# Patient Record
Sex: FEMALE | Race: WHITE
[De-identification: names, ages, dates, MRNs, and addresses within clinical notes are randomized per-mention and may not be internally consistent; named-entity substitution may affect disease eponyms.]

---

## 2017-01-13 ENCOUNTER — HOSPITAL ENCOUNTER (OUTPATIENT)
Age: 67
Discharge: HOME | End: 2017-01-13
Payer: MEDICAID

## 2017-01-13 DIAGNOSIS — D05.91: Primary | ICD-10-CM

## 2017-09-11 ENCOUNTER — HOSPITAL ENCOUNTER (OUTPATIENT)
Dept: HOSPITAL 76 - LAB.S | Age: 67
Discharge: HOME | End: 2017-09-11
Attending: NURSE PRACTITIONER
Payer: MEDICARE

## 2017-09-11 DIAGNOSIS — I10: Primary | ICD-10-CM

## 2017-09-11 DIAGNOSIS — J44.9: ICD-10-CM

## 2017-09-11 LAB
ALBUMIN/GLOB SERPL: 1.5 {RATIO} (ref 1–2.2)
ANION GAP SERPL CALCULATED.4IONS-SCNC: 7 MMOL/L (ref 6–13)
BASOPHILS NFR BLD AUTO: 0.1 10^3/UL (ref 0–0.1)
BASOPHILS NFR BLD AUTO: 1.5 %
BILIRUB BLD-MCNC: 0.4 MG/DL (ref 0.2–1)
BUN SERPL-MCNC: 10 MG/DL (ref 6–20)
CALCIUM UR-MCNC: 9.4 MG/DL (ref 8.5–10.3)
CHLORIDE SERPL-SCNC: 103 MMOL/L (ref 101–111)
CHOLEST SERPL-MCNC: 264 MG/DL
CO2 SERPL-SCNC: 26 MMOL/L (ref 21–32)
CREAT SERPLBLD-SCNC: 0.7 MG/DL (ref 0.4–1)
EOSINOPHIL # BLD AUTO: 0.3 10^3/UL (ref 0–0.7)
EOSINOPHIL NFR BLD AUTO: 4.9 %
ERYTHROCYTE [DISTWIDTH] IN BLOOD BY AUTOMATED COUNT: 14.4 % (ref 12–15)
GFRSERPLBLD MDRD-ARVRAT: 84 ML/MIN/{1.73_M2} (ref 89–?)
GLOBULIN SER-MCNC: 2.6 G/DL (ref 2.1–4.2)
GLUCOSE SERPL-MCNC: 93 MG/DL (ref 70–100)
HCT VFR BLD AUTO: 40.2 % (ref 37–47)
HDLC SERPL-MCNC: 57 MG/DL
HDLC SERPL: 4.6 {RATIO} (ref ?–4.4)
HGB UR QL STRIP: 13.1 G/DL (ref 12–16)
LDLC/HDLC SERPL: 3 {RATIO} (ref ?–4.4)
LYMPHOCYTES # SPEC AUTO: 1.7 10^3/UL (ref 1.5–3.5)
LYMPHOCYTES NFR BLD AUTO: 26.8 %
MCH RBC QN AUTO: 29.2 PG (ref 27–31)
MCHC RBC AUTO-ENTMCNC: 32.5 G/DL (ref 32–36)
MCV RBC AUTO: 89.6 FL (ref 81–99)
MONOCYTES # BLD AUTO: 0.6 10^3/UL (ref 0–1)
MONOCYTES NFR BLD AUTO: 10.5 %
NEUTROPHILS # BLD AUTO: 3.5 10^3/UL (ref 1.5–6.6)
NEUTROPHILS # SNV AUTO: 6.2 X10^3/UL (ref 4.8–10.8)
NEUTROPHILS NFR BLD AUTO: 56.3 %
NRBC # BLD AUTO: 0.1 /100WBC
PDW BLD AUTO: 10.7 FL (ref 7.9–10.8)
POTASSIUM SERPL-SCNC: 4.5 MMOL/L (ref 3.5–5)
PROT SPEC-MCNC: 6.6 G/DL (ref 6.7–8.2)
RBC MAR: 4.48 10^6/UL (ref 4.2–5.4)
SODIUM SERPLBLD-SCNC: 136 MMOL/L (ref 135–145)
TRIGL P FAST SERPL-MCNC: 183 MG/DL
VLDLC SERPL-SCNC: 37 MG/DL
WBC # BLD: 6.2 X10^3/UL

## 2017-09-11 PROCEDURE — 80053 COMPREHEN METABOLIC PANEL: CPT

## 2017-09-11 PROCEDURE — 84443 ASSAY THYROID STIM HORMONE: CPT

## 2017-09-11 PROCEDURE — 85025 COMPLETE CBC W/AUTO DIFF WBC: CPT

## 2017-09-11 PROCEDURE — 80061 LIPID PANEL: CPT

## 2017-09-11 PROCEDURE — 36415 COLL VENOUS BLD VENIPUNCTURE: CPT

## 2018-01-24 ENCOUNTER — HOSPITAL ENCOUNTER (OUTPATIENT)
Dept: HOSPITAL 76 - DI.S | Age: 68
Discharge: HOME | End: 2018-01-24
Attending: NURSE PRACTITIONER
Payer: MEDICARE

## 2018-01-24 DIAGNOSIS — Z85.3: ICD-10-CM

## 2018-01-24 DIAGNOSIS — Z12.31: Primary | ICD-10-CM

## 2018-01-24 PROCEDURE — 77067 SCR MAMMO BI INCL CAD: CPT

## 2018-01-25 NOTE — MAMMOGRAPHY REPORT
DATE OF SERVICE: 01/24/2018

 

DIGITAL SCREENING MAMMOGRAM:  01/24/2018

 

CLINICAL INDICATION:  A 67-year-old with personal history of right breast cancer, status post 

lumpectomy and radiation therapy for screening.

 

COMPARISON:  01/2017, 01/2016, 01/2015, 07/2014, 01/2014, 07/2013, 01/2013, 07/2012, 04/2012, 

03/2012.

 

TECHNIQUE:  Routine CC and MLO projections were obtained of the breasts.

 

The breasts demonstrate scattered fibroglandular densities bilaterally.  Postoperative and 

posttreatment changes in the left breast are stable.  A few punctate, typically benign 

calcifications are present.  No suspicious masses, clustered microcalcifications, or regions of

architectural distortion are identified.

 

IMPRESSION:  Benign findings.

 

RECOMMENDATIONS:  Routine annual screening unless otherwise clinically indicated.

 

BIRADS category 2 benign findings.

 

STANDARD QUALIFYING STATEMENTS

1.  This examination was reviewed with the aid of Computed-Aided Detection (CAD).

2.  A negative or benign imaging report should not delay biopsy if clinically suspicious 

findings are present.  Consider surgical consultation if warranted.  More than 5% of cancers 

are not identified by imaging.

3.  Dense breasts may obscure an underlying neoplasm.

 

 

DD: 01/25/2018 13:10

TD: 01/25/2018 22:28

Job #: [id number]

## 2018-10-29 ENCOUNTER — HOSPITAL ENCOUNTER (OUTPATIENT)
Dept: HOSPITAL 76 - LAB.S | Age: 68
End: 2018-10-29
Attending: NURSE PRACTITIONER
Payer: MEDICARE

## 2018-10-29 DIAGNOSIS — I10: Primary | ICD-10-CM

## 2018-10-29 DIAGNOSIS — F32.9: ICD-10-CM

## 2018-10-29 LAB
ALBUMIN DIAFP-MCNC: 3.9 G/DL (ref 3.2–5.5)
ALBUMIN/GLOB SERPL: 1.2 {RATIO} (ref 1–2.2)
ALP SERPL-CCNC: 55 IU/L (ref 42–121)
ALT SERPL W P-5'-P-CCNC: 16 IU/L (ref 10–60)
ANION GAP SERPL CALCULATED.4IONS-SCNC: 6 MMOL/L (ref 6–13)
AST SERPL W P-5'-P-CCNC: 21 IU/L (ref 10–42)
BASOPHILS NFR BLD AUTO: 0.1 10^3/UL (ref 0–0.1)
BASOPHILS NFR BLD AUTO: 1.2 %
BILIRUB BLD-MCNC: 0.3 MG/DL (ref 0.2–1)
BUN SERPL-MCNC: 12 MG/DL (ref 6–20)
CALCIUM UR-MCNC: 9.4 MG/DL (ref 8.5–10.3)
CHLORIDE SERPL-SCNC: 102 MMOL/L (ref 101–111)
CHOLEST SERPL-MCNC: 279 MG/DL
CO2 SERPL-SCNC: 29 MMOL/L (ref 21–32)
CREAT SERPLBLD-SCNC: 0.8 MG/DL (ref 0.4–1)
EOSINOPHIL # BLD AUTO: 0.5 10^3/UL (ref 0–0.7)
EOSINOPHIL NFR BLD AUTO: 6.5 %
ERYTHROCYTE [DISTWIDTH] IN BLOOD BY AUTOMATED COUNT: 13.5 % (ref 12–15)
GFRSERPLBLD MDRD-ARVRAT: 72 ML/MIN/{1.73_M2} (ref 89–?)
GLOBULIN SER-MCNC: 3.3 G/DL (ref 2.1–4.2)
GLUCOSE SERPL-MCNC: 108 MG/DL (ref 70–100)
HDLC SERPL-MCNC: 64 MG/DL
HDLC SERPL: 4.4 {RATIO} (ref ?–4.4)
HGB UR QL STRIP: 13.7 G/DL (ref 12–16)
LDLC SERPL CALC-MCNC: 177 MG/DL
LDLC/HDLC SERPL: 2.8 {RATIO} (ref ?–4.4)
LYMPHOCYTES # SPEC AUTO: 1.6 10^3/UL (ref 1.5–3.5)
LYMPHOCYTES NFR BLD AUTO: 22 %
MCH RBC QN AUTO: 29.9 PG (ref 27–31)
MCHC RBC AUTO-ENTMCNC: 33 G/DL (ref 32–36)
MCV RBC AUTO: 90.6 FL (ref 81–99)
MONOCYTES # BLD AUTO: 0.9 10^3/UL (ref 0–1)
MONOCYTES NFR BLD AUTO: 11.6 %
NEUTROPHILS # BLD AUTO: 4.3 10^3/UL (ref 1.5–6.6)
NEUTROPHILS # SNV AUTO: 7.3 X10^3/UL (ref 4.8–10.8)
NEUTROPHILS NFR BLD AUTO: 58.7 %
PDW BLD AUTO: 11.2 FL (ref 7.9–10.8)
PLATELET # BLD: 235 10^3/UL (ref 130–450)
PROT SPEC-MCNC: 7.2 G/DL (ref 6.7–8.2)
RBC MAR: 4.58 10^6/UL (ref 4.2–5.4)
SODIUM SERPLBLD-SCNC: 137 MMOL/L (ref 135–145)
VLDLC SERPL-SCNC: 38 MG/DL

## 2018-10-29 PROCEDURE — 83721 ASSAY OF BLOOD LIPOPROTEIN: CPT

## 2018-10-29 PROCEDURE — 80061 LIPID PANEL: CPT

## 2018-10-29 PROCEDURE — 36415 COLL VENOUS BLD VENIPUNCTURE: CPT

## 2018-10-29 PROCEDURE — 80053 COMPREHEN METABOLIC PANEL: CPT

## 2018-10-29 PROCEDURE — 85025 COMPLETE CBC W/AUTO DIFF WBC: CPT

## 2018-10-29 PROCEDURE — 84443 ASSAY THYROID STIM HORMONE: CPT

## 2018-12-30 ENCOUNTER — HOSPITAL ENCOUNTER (OUTPATIENT)
Dept: HOSPITAL 76 - EMS | Age: 68
Discharge: ANOTHER HOSPITAL | End: 2018-12-30
Attending: SURGERY
Payer: MEDICARE

## 2018-12-30 ENCOUNTER — HOSPITAL ENCOUNTER (EMERGENCY)
Dept: HOSPITAL 76 - ED | Age: 68
Discharge: HOME | End: 2018-12-30
Payer: MEDICARE

## 2018-12-30 VITALS — DIASTOLIC BLOOD PRESSURE: 82 MMHG | SYSTOLIC BLOOD PRESSURE: 152 MMHG

## 2018-12-30 DIAGNOSIS — F17.200: ICD-10-CM

## 2018-12-30 DIAGNOSIS — K62.5: Primary | ICD-10-CM

## 2018-12-30 DIAGNOSIS — K52.9: Primary | ICD-10-CM

## 2018-12-30 DIAGNOSIS — I10: ICD-10-CM

## 2018-12-30 DIAGNOSIS — R10.84: ICD-10-CM

## 2018-12-30 LAB
ALBUMIN DIAFP-MCNC: 4 G/DL (ref 3.2–5.5)
ALBUMIN/GLOB SERPL: 1.1 {RATIO} (ref 1–2.2)
ALP SERPL-CCNC: 59 IU/L (ref 42–121)
ALT SERPL W P-5'-P-CCNC: 28 IU/L (ref 10–60)
ANION GAP SERPL CALCULATED.4IONS-SCNC: 11 MMOL/L (ref 6–13)
AST SERPL W P-5'-P-CCNC: 31 IU/L (ref 10–42)
BASOPHILS NFR BLD AUTO: 0.1 10^3/UL (ref 0–0.1)
BASOPHILS NFR BLD AUTO: 0.3 %
BILIRUB BLD-MCNC: 0.8 MG/DL (ref 0.2–1)
BUN SERPL-MCNC: 15 MG/DL (ref 6–20)
CALCIUM UR-MCNC: 9.1 MG/DL (ref 8.5–10.3)
CHLORIDE SERPL-SCNC: 98 MMOL/L (ref 101–111)
CLARITY UR REFRACT.AUTO: CLEAR
CO2 SERPL-SCNC: 26 MMOL/L (ref 21–32)
CREAT SERPLBLD-SCNC: 0.9 MG/DL (ref 0.4–1)
EOSINOPHIL # BLD AUTO: 0 10^3/UL (ref 0–0.7)
EOSINOPHIL NFR BLD AUTO: 0.1 %
ERYTHROCYTE [DISTWIDTH] IN BLOOD BY AUTOMATED COUNT: 13.9 % (ref 12–15)
GFRSERPLBLD MDRD-ARVRAT: 63 ML/MIN/{1.73_M2} (ref 89–?)
GLOBULIN SER-MCNC: 3.5 G/DL (ref 2.1–4.2)
GLUCOSE SERPL-MCNC: 157 MG/DL (ref 70–100)
GLUCOSE UR QL STRIP.AUTO: NEGATIVE MG/DL
HGB UR QL STRIP: 16.2 G/DL (ref 12–16)
INR PPP: 1.1 (ref 0.8–1.2)
KETONES UR QL STRIP.AUTO: 15 MG/DL
LIPASE SERPL-CCNC: 24 U/L (ref 22–51)
LYMPHOCYTES # SPEC AUTO: 1 10^3/UL (ref 1.5–3.5)
LYMPHOCYTES NFR BLD AUTO: 5.2 %
MCH RBC QN AUTO: 30.5 PG (ref 27–31)
MCHC RBC AUTO-ENTMCNC: 33.6 G/DL (ref 32–36)
MCV RBC AUTO: 90.7 FL (ref 81–99)
MONOCYTES # BLD AUTO: 1.6 10^3/UL (ref 0–1)
MONOCYTES NFR BLD AUTO: 8.4 %
NEUTROPHILS # BLD AUTO: 16.5 10^3/UL (ref 1.5–6.6)
NEUTROPHILS # SNV AUTO: 19.2 X10^3/UL (ref 4.8–10.8)
NEUTROPHILS NFR BLD AUTO: 86 %
NITRITE UR QL STRIP.AUTO: NEGATIVE
PDW BLD AUTO: 9.1 FL (ref 7.9–10.8)
PH UR STRIP.AUTO: 6 PH (ref 5–7.5)
PLATELET # BLD: 246 10^3/UL (ref 130–450)
PROT SPEC-MCNC: 7.5 G/DL (ref 6.7–8.2)
PROT UR STRIP.AUTO-MCNC: NEGATIVE MG/DL
PROTHROM ACT/NOR PPP: 12.1 SECS (ref 9.9–12.6)
RBC # UR STRIP.AUTO: NEGATIVE /UL
RBC MAR: 5.32 10^6/UL (ref 4.2–5.4)
SODIUM SERPLBLD-SCNC: 135 MMOL/L (ref 135–145)
SP GR UR STRIP.AUTO: 1.02 (ref 1–1.03)
UROBILINOGEN UR QL STRIP.AUTO: (no result) E.U./DL
UROBILINOGEN UR STRIP.AUTO-MCNC: NEGATIVE MG/DL

## 2018-12-30 PROCEDURE — 96374 THER/PROPH/DIAG INJ IV PUSH: CPT

## 2018-12-30 PROCEDURE — 81001 URINALYSIS AUTO W/SCOPE: CPT

## 2018-12-30 PROCEDURE — 96375 TX/PRO/DX INJ NEW DRUG ADDON: CPT

## 2018-12-30 PROCEDURE — 96361 HYDRATE IV INFUSION ADD-ON: CPT

## 2018-12-30 PROCEDURE — 74177 CT ABD & PELVIS W/CONTRAST: CPT

## 2018-12-30 PROCEDURE — 99283 EMERGENCY DEPT VISIT LOW MDM: CPT

## 2018-12-30 PROCEDURE — 83690 ASSAY OF LIPASE: CPT

## 2018-12-30 PROCEDURE — 87086 URINE CULTURE/COLONY COUNT: CPT

## 2018-12-30 PROCEDURE — 87046 STOOL CULTR AEROBIC BACT EA: CPT

## 2018-12-30 PROCEDURE — 81003 URINALYSIS AUTO W/O SCOPE: CPT

## 2018-12-30 PROCEDURE — 36415 COLL VENOUS BLD VENIPUNCTURE: CPT

## 2018-12-30 PROCEDURE — 87045 FECES CULTURE AEROBIC BACT: CPT

## 2018-12-30 PROCEDURE — 83605 ASSAY OF LACTIC ACID: CPT

## 2018-12-30 PROCEDURE — 80053 COMPREHEN METABOLIC PANEL: CPT

## 2018-12-30 PROCEDURE — 85610 PROTHROMBIN TIME: CPT

## 2018-12-30 PROCEDURE — 85025 COMPLETE CBC W/AUTO DIFF WBC: CPT

## 2018-12-30 PROCEDURE — 87493 C DIFF AMPLIFIED PROBE: CPT

## 2018-12-30 NOTE — ED PHYSICIAN DOCUMENTATION
PD HPI ABD PAIN





- Stated complaint


Stated Complaint: ABD PX





- Chief complaint


Chief Complaint: Abd Pain





- History obtained from


History obtained from: Patient, EMS





- History of Present Illness


Timing - onset: Other (She been constipated for a few days but starting around 

10:00 last night started to have severe abdominal cramps associated with 

vomiting and diarrhea.  She has not vomited in almost 12 hours but the diarrhea 

continues and became grossly bloody this morning.  She denies any fevers.  No 

recent travel, sick contacts, or antibiotic use.  She is a history of 

hysterectomy but no other abdominal surgeries or bowel problems.)





Review of Systems


Ten Systems: 10 systems reviewed and negative


Constitutional: denies: Fever, Chills


Eyes: reports: Reviewed and negative


GI: reports: Abdominal Pain, Nausea, Vomiting, Diarrhea, Bloody / black stool.  

denies: Hematemesis





PD PAST MEDICAL HISTORY





- Past Medical History


Cardiovascular: Hypertension


Respiratory: Asthma, Shortness of breath


Endocrine/Autoimmune: None


GI: None


GYN: Other


: None


HEENT: None


Psych: Panic attacks


Musculoskeletal: Osteoarthritis


Derm: None





- Past Surgical History


Past Surgical History: Yes


General: Colonoscopy


Ortho: Spine surgery


/GYN:  section, Dilation and currettage, Tubal ligation, Hysterectomy,

LEEP (Cervical surgery)


HEENT: Tonsil/Adenoidectomy





- Present Medications


Home Medications: 


                                Ambulatory Orders











 Medication  Instructions  Recorded  Confirmed


 


Lisinopril 40 mg PO DAILY 05/08/13 10/02/17


 


Multivitamin [Multivitamins] 1 each PO DAILY 07/21/14 10/02/17


 


Carvedilol 12.5 mg PO BID 02/03/15 10/02/17


 


Calcium Carb, Citrate/Vit D3 600 mg PO BID 08/04/15 10/02/17





[Calcium + D3 ER Tablet]   


 


Albuterol 2.5 mg INH Q4H PRN 16


 


Albuterol Sulf [Ventolin Hfa 2 puffs INH Q4H PRN 16





Inhaler]   


 


Tiotropium Bromide [Spiriva] 2 puffs IH DAILY 12/12/16 10/02/17


 


buPROPion [Wellbutrin Sr] 150 mg PO BID 10/02/17 10/02/17


 


Ciprofloxacin HCl [Cipro] 500 mg PO BID #20 tablet 18 


 


Hydrocodone/Acetaminophen 1 - 2 each PO Q6H PRN #10 tablet 18 





[Hydrocodon-Acetaminophen 5-325]   


 


Metronidazole [Flagyl] 500 mg PO BID #20 tablet 18 


 


amLODIPine [Norvasc] 5 mg PO DAILY 18














- Allergies


Allergies/Adverse Reactions: 


                                    Allergies











Allergy/AdvReac Type Severity Reaction Status Date / Time


 


Latex, Natural Rubber Allergy  Itching Verified 18 12:49


 


Penicillins Allergy  Hives Verified 18 12:49


 


adhesive AdvReac Intermediate Rash Verified 18 12:49














- Social History


Does the pt smoke?: Yes


Smoking Status: Current every day smoker


Does the pt drink ETOH?: No


Does the pt have substance abuse?: No





- Immunizations


Immunizations are current?: No


Immunizations: TDAP >10years/unknown





PD ED PE NORMAL





- Vitals


Vital signs reviewed: Yes (Hypertensive and tachycardic)





- General


General: Alert and oriented X 3, No acute distress





- HEENT


HEENT: PERRL, EOMI





- Neck


Neck: Supple, no meningeal sign, No bony TTP





- Cardiac


Cardiac: RRR, No murmur





- Respiratory


Respiratory: No respiratory distress, Clear bilaterally





- Abdomen


Abdomen: Other (Soft with slightly diminished but active bowel tones and left-

sided abdominal tenderness without surgical signs)





- Back


Back: No spinal TTP





- Derm


Derm: Normal color, Warm and dry





- Extremities


Extremities: No edema, No calf tenderness / cord





- Neuro


Neuro: Alert and oriented X 3, Normal speech





- Psych


Psych: Normal mood, Normal affect





Results





- Vitals


Vitals: 


                               Vital Signs - 24 hr











  18





  12:39


 


Temperature 37 C


 


Heart Rate 115 H


 


Respiratory 20





Rate 


 


Blood Pressure 187/114 H


 


O2 Saturation 98








                                     Oxygen











O2 Source                      Room air

















- Labs


Labs: 


                                  Microbiology











 18 13:13 Campylobacter Antigen Assay - Final





 Stool 








                                Laboratory Tests











  18





  12:59 12:59 12:59


 


WBC  19.2 H  


 


RBC  5.32  


 


Hgb  16.2 H  


 


Hct  48.2 H  


 


MCV  90.7  


 


MCH  30.5  


 


MCHC  33.6  


 


RDW  13.9  


 


Plt Count  246  


 


MPV  9.1  


 


Neut # (Auto)  16.5 H  


 


Lymph # (Auto)  1.0 L  


 


Mono # (Auto)  1.6 H  


 


Eos # (Auto)  0.0  


 


Baso # (Auto)  0.1  


 


Absolute Nucleated RBC  0.00  


 


Nucleated RBC %  0.0  


 


PT   12.1 


 


INR   1.1 


 


Sodium    135


 


Potassium    3.9


 


Chloride    98 L


 


Carbon Dioxide    26


 


Anion Gap    11.0


 


BUN    15


 


Creatinine    0.9


 


Estimated GFR (MDRD)    63 L


 


Glucose    157 H


 


Lactic Acid   


 


Calcium    9.1


 


Total Bilirubin    0.8


 


AST    31


 


ALT    28


 


Alkaline Phosphatase    59


 


Total Protein    7.5


 


Albumin    4.0


 


Globulin    3.5


 


Albumin/Globulin Ratio    1.1


 


Lipase    24


 


Urine Color   


 


Urine Clarity   


 


Urine pH   


 


Ur Specific Gravity   


 


Urine Protein   


 


Urine Glucose (UA)   


 


Urine Ketones   


 


Urine Occult Blood   


 


Urine Nitrite   


 


Urine Bilirubin   


 


Urine Urobilinogen   


 


Ur Leukocyte Esterase   


 


Ur Microscopic Review   


 


Urine Culture Comments   














  18





  12:59 13:13


 


WBC  


 


RBC  


 


Hgb  


 


Hct  


 


MCV  


 


MCH  


 


MCHC  


 


RDW  


 


Plt Count  


 


MPV  


 


Neut # (Auto)  


 


Lymph # (Auto)  


 


Mono # (Auto)  


 


Eos # (Auto)  


 


Baso # (Auto)  


 


Absolute Nucleated RBC  


 


Nucleated RBC %  


 


PT  


 


INR  


 


Sodium  


 


Potassium  


 


Chloride  


 


Carbon Dioxide  


 


Anion Gap  


 


BUN  


 


Creatinine  


 


Estimated GFR (MDRD)  


 


Glucose  


 


Lactic Acid  1.4 


 


Calcium  


 


Total Bilirubin  


 


AST  


 


ALT  


 


Alkaline Phosphatase  


 


Total Protein  


 


Albumin  


 


Globulin  


 


Albumin/Globulin Ratio  


 


Lipase  


 


Urine Color   DARK YELLOW


 


Urine Clarity   CLEAR


 


Urine pH   6.0


 


Ur Specific Gravity   1.020


 


Urine Protein   NEGATIVE


 


Urine Glucose (UA)   NEGATIVE


 


Urine Ketones   15 H


 


Urine Occult Blood   NEGATIVE


 


Urine Nitrite   NEGATIVE


 


Urine Bilirubin   NEGATIVE


 


Urine Urobilinogen   0.2 (NORMAL)


 


Ur Leukocyte Esterase   NEGATIVE


 


Ur Microscopic Review   NOT INDICATED


 


Urine Culture Comments   NOT INDICATED














- Rads (name of study)


  ** CT A/P


Radiology: EMP read contemporaneously (Transverse descending and sigmoid coliti

s)





PD MEDICAL DECISION MAKING





- ED course


ED course: 





68-year-old woman with bloody diarrhea and left-sided abdominal tenderness cons

istent with colitis which is corroborated on CT.  Feeling better after meds and 

fluids here and will be treated with Cipro and Flagyl.





Departure





- Departure


Disposition: 01 Home, Self Care


Clinical Impression: 


 Colitis





Abdominal pain


Qualifiers:


 Abdominal location: generalized Qualified Code(s): R10.84 - Generalized 

abdominal pain





Condition: Good


Record reviewed to determine appropriate education?: Yes


Instructions:  ED Diarrhea Bacterial


Prescriptions: 


Ciprofloxacin HCl [Cipro] 500 mg PO BID #20 tablet


Hydrocodone/Acetaminophen [Hydrocodon-Acetaminophen 5-325] 1 - 2 each PO Q6H PRN

#10 tablet


 PRN Reason: pain


Metronidazole [Flagyl] 500 mg PO BID #20 tablet


Comments: 


Call your doctor to arrange a follow-up appointment, make the next available 

appointment.  In the interim, return anytime if worse or if new symptoms 

develop.





Your blood pressure was elevated today on check into the emergency department.  

This does not mean that you have hypertension, it is a common phenomenon to come

 to the emergency department and have elevated blood pressure.  I recommend that

 you see your primary care physician within the week to have it rechecked when 

you are feeling better.

## 2018-12-30 NOTE — CT REPORT
Reason:  IV only, abd pain w hematochezia

Procedure Date:  12/30/2018   

Accession Number:  [id number] / [id number]                    

Procedure:  CT  - Abdomen/Pelvis W/ CPT Code:  

 

FULL RESULT:

 

 

EXAM:

CT ABDOMEN AND PELVIS

 

EXAM DATE: 12/30/2018 01:44 PM.

 

CLINICAL HISTORY: Abd pain w hematochezia.

 

COMPARISONS: None.

 

TECHNIQUE: Routine helical CT imaging was performed through the abdomen 

and pelvis. IV contrast: OPTI 320  100mL. Enteric contrast: No. 

Reconstructions: Coronal and sagittal.

 

In accordance with CT protocol optimization, one or more of the following 

dose reduction techniques were utilized for this exam: automated exposure 

control, adjustment of mA and/or KV based on patient size, or use of 

iterative reconstructive technique.

 

FINDINGS:

Lung Bases: Unremarkable.

 

Liver: Probable hepatic steatosis. No focal parenchymal lesion 

demonstrated.

 

Gallbladder/Bile Ducts: Unremarkable.

 

Spleen: Normal.

 

Pancreas: Atrophic. No other significant abnormality.

 

Adrenal Glands: Normal.

 

Kidneys: No significant abnormality.

 

Peritoneal Cavity/Bowel: No ascites or pneumoperitoneum. No bowel 

obstruction or abnormal stool burden. Diffuse, low density colonic wall 

thickening and adjacent mild inflammatory stranding from the hepatic 

flexure to the rectosigmoid junction, most pronounced in the distal 

transverse colon. Possible appendicolith; otherwise normal appendix 

(coronal images 21-25).

 

Pelvic Organs: Status post hysterectomy. The urinary bladder is 

unremarkable.

 

Vasculature: Moderate atherosclerosis without aneurysm or other 

significant abnormality.

 

Bones: Scoliosis and degenerative change. No acute osseous abnormality or 

aggressive osseous lesion.

 

Other: None.

IMPRESSION: Transverse, descending, and sigmoid colitis.

 

[facility]

## 2019-01-14 ENCOUNTER — HOSPITAL ENCOUNTER (OUTPATIENT)
Dept: HOSPITAL 76 - LAB.S | Age: 69
Discharge: HOME | End: 2019-01-14
Attending: NURSE PRACTITIONER
Payer: MEDICARE

## 2019-01-14 DIAGNOSIS — K52.9: Primary | ICD-10-CM

## 2019-01-14 LAB
ALBUMIN DIAFP-MCNC: 3.8 G/DL (ref 3.2–5.5)
ALBUMIN/GLOB SERPL: 1.2 {RATIO} (ref 1–2.2)
ALP SERPL-CCNC: 40 IU/L (ref 42–121)
ALT SERPL W P-5'-P-CCNC: 25 IU/L (ref 10–60)
ANION GAP SERPL CALCULATED.4IONS-SCNC: 7 MMOL/L (ref 6–13)
AST SERPL W P-5'-P-CCNC: 25 IU/L (ref 10–42)
BASOPHILS NFR BLD AUTO: 0.1 10^3/UL (ref 0–0.1)
BASOPHILS NFR BLD AUTO: 1.4 %
BILIRUB BLD-MCNC: 0.4 MG/DL (ref 0.2–1)
BUN SERPL-MCNC: 10 MG/DL (ref 6–20)
CALCIUM UR-MCNC: 9.8 MG/DL (ref 8.5–10.3)
CHLORIDE SERPL-SCNC: 102 MMOL/L (ref 101–111)
CO2 SERPL-SCNC: 29 MMOL/L (ref 21–32)
CREAT SERPLBLD-SCNC: 0.6 MG/DL (ref 0.4–1)
EOSINOPHIL # BLD AUTO: 0.5 10^3/UL (ref 0–0.7)
EOSINOPHIL NFR BLD AUTO: 6 %
ERYTHROCYTE [DISTWIDTH] IN BLOOD BY AUTOMATED COUNT: 14.5 % (ref 12–15)
GFRSERPLBLD MDRD-ARVRAT: 100 ML/MIN/{1.73_M2} (ref 89–?)
GLOBULIN SER-MCNC: 3.2 G/DL (ref 2.1–4.2)
GLUCOSE SERPL-MCNC: 100 MG/DL (ref 70–100)
HGB UR QL STRIP: 14.2 G/DL (ref 12–16)
LYMPHOCYTES # SPEC AUTO: 1.6 10^3/UL (ref 1.5–3.5)
LYMPHOCYTES NFR BLD AUTO: 18.3 %
MCH RBC QN AUTO: 30.1 PG (ref 27–31)
MCHC RBC AUTO-ENTMCNC: 32.4 G/DL (ref 32–36)
MCV RBC AUTO: 93 FL (ref 81–99)
MONOCYTES # BLD AUTO: 0.8 10^3/UL (ref 0–1)
MONOCYTES NFR BLD AUTO: 9.3 %
NEUTROPHILS # BLD AUTO: 5.8 10^3/UL (ref 1.5–6.6)
NEUTROPHILS # SNV AUTO: 8.9 X10^3/UL (ref 4.8–10.8)
NEUTROPHILS NFR BLD AUTO: 65 %
PDW BLD AUTO: 11.5 FL (ref 7.9–10.8)
PLATELET # BLD: 278 10^3/UL (ref 130–450)
PROT SPEC-MCNC: 7 G/DL (ref 6.7–8.2)
RBC MAR: 4.7 10^6/UL (ref 4.2–5.4)
SODIUM SERPLBLD-SCNC: 138 MMOL/L (ref 135–145)

## 2019-01-14 PROCEDURE — 85025 COMPLETE CBC W/AUTO DIFF WBC: CPT

## 2019-01-14 PROCEDURE — 80053 COMPREHEN METABOLIC PANEL: CPT

## 2019-01-14 PROCEDURE — 36415 COLL VENOUS BLD VENIPUNCTURE: CPT

## 2019-02-25 ENCOUNTER — HOSPITAL ENCOUNTER (OUTPATIENT)
Dept: HOSPITAL 76 - DI | Age: 69
Discharge: HOME | End: 2019-02-25
Attending: NURSE PRACTITIONER
Payer: MEDICARE

## 2019-02-25 DIAGNOSIS — Z85.3: ICD-10-CM

## 2019-02-25 DIAGNOSIS — Z12.31: Primary | ICD-10-CM

## 2019-02-25 DIAGNOSIS — Z85.118: ICD-10-CM

## 2019-02-25 PROCEDURE — 77067 SCR MAMMO BI INCL CAD: CPT

## 2019-02-25 PROCEDURE — 77063 BREAST TOMOSYNTHESIS BI: CPT

## 2019-02-26 NOTE — MAMMOGRAPHY REPORT
Reason:  MAMMOGRAPHIC SCREENING FOR BREAST CANCER

Procedure Date:  02/25/2019   

Accession Number:  [id number] / [id number]                    

Procedure:  RODRIGUEZ - Screening Mammo w/Tate CPT Code:  

 

FULL RESULT:

 

 

EXAM: Screening Mammo w/Tate

 

DATE: 2/25/2019 4:30 PM

 

CLINICAL HISTORY: Screening encounter. Personal history of right breast 

cancer status post lumpectomy and radiation as well as personal history 

of lung cancer status post right upper lobectomy.

 

TECHNIQUE: Bilateral CC and MLO views were obtained.

 

COMPARISON: 1/24/2018 through 1/29/2015.

 

FINDINGS:

The breasts demonstrate scattered fibroglandular densities bilaterally. 

Posttreatment changes in the right breast are stable. No suspicious 

masses, clustered microcalcifications, or regions of architectural 

distortion are identified.

 

IMPRESSION: Benign findings

 

RECOMMENDATION: Routine annual screening unless otherwise clinically 

indicated.

 

BIRADS CATEGORY 2: Benign findings

 

STANDARD QUALIFYING STATEMENTS:

1.  This examination was not reviewed with the aid of Computer-Aided 

Detection (CAD).

2.  A negative or benign  imaging report should not delay biopsy if 

clinically suspicious findings are present.  Consider surgical 

consultation if warrented.  More than 5% of cancers are not identified by 

imaging.

3.  Dense breasts may obscure an underlying neoplasm.

4. This examination was reviewed with the aid of 3D breast imaging 

(tomosynthesis).

## 2019-04-23 ENCOUNTER — HOSPITAL ENCOUNTER (OUTPATIENT)
Dept: HOSPITAL 76 - DI | Age: 69
Discharge: HOME | End: 2019-04-23
Attending: INTERNAL MEDICINE
Payer: MEDICARE

## 2019-04-23 DIAGNOSIS — R93.5: Primary | ICD-10-CM

## 2019-04-23 PROCEDURE — 93975 VASCULAR STUDY: CPT

## 2019-04-23 NOTE — ULTRASOUND REPORT
Reason:  CT- COLON WALL THICKENING FROM DISTAL

Procedure Date:  04/23/2019   

Accession Number:  [id number] / [id number]                    

Procedure:  US  - Arterial Visceral Complete CPT Code:  

 

FULL RESULT:

 

 

EXAM:

MESENTERIC/CELIAC ARTERY DOPPLER ULTRASOUND

 

EXAM DATE: 4/23/2019 09:52 AM.

 

CLINICAL HISTORY: CT- Colon wall thickening from distal.

 

COMPARISON: None.

 

TECHNIQUE: Real-time sonographic vascular imaging was performed by the 

sonographer through the mesenteric arterial system with a linear 

transducer utilizing color-flow, Doppler flow and spectral analysis. 

Multiple representative static images were saved for review.

 

FINDINGS: The upper abdominal aorta is visually atherosclerotic without 

visualized aneurysm in the documented areas.

 

The visualized celiac axis, SMA, hepatic artery and splenic artery are 

patent with peak systolic velocities as described. All waveforms 

demonstrate preserved brisk systolic upstrokes.

 

Aorta: 71cm/sec.

 

Celiac Axis:

Proximal: 158.6 cm/sec.

Mid: 168.7 cm/sec.

Distal: 195.6 cm/sec.

 

Hepatic Artery: 228 cm/sec.*

Splenic Artery: 67.4 cm/sec.**

 

* Hepatic artery @ manjinder hep 113 cm/sec.

** Splenic not seen @ madisyn axis.

 

Superior Mesenteric Artery

Proximal: 122.5 cm/sec.

Mid: 108 cm/sec.

Distal: 105.4 cm/sec.

 

Inferior Mesenteric Artery: Not seen.

IMPRESSION: Normal patency and waveforms of the above visualized visceral 

vasculature.

 

[facility]

## 2019-06-03 ENCOUNTER — HOSPITAL ENCOUNTER (OUTPATIENT)
Dept: HOSPITAL 76 - LAB.F | Age: 69
Discharge: HOME | End: 2019-06-03
Attending: NURSE PRACTITIONER
Payer: MEDICARE

## 2019-06-03 DIAGNOSIS — E78.5: Primary | ICD-10-CM

## 2019-06-03 LAB
CHOLEST SERPL-MCNC: 202 MG/DL
HDLC SERPL-MCNC: 64 MG/DL
HDLC SERPL: 3.2 {RATIO} (ref ?–4.4)
LDLC SERPL CALC-MCNC: 124 MG/DL
LDLC/HDLC SERPL: 1.9 {RATIO} (ref ?–4.4)
VLDLC SERPL-SCNC: 14 MG/DL

## 2019-06-03 PROCEDURE — 80061 LIPID PANEL: CPT

## 2019-06-03 PROCEDURE — 36415 COLL VENOUS BLD VENIPUNCTURE: CPT

## 2019-06-03 PROCEDURE — 83721 ASSAY OF BLOOD LIPOPROTEIN: CPT

## 2019-06-11 ENCOUNTER — HOSPITAL ENCOUNTER (EMERGENCY)
Dept: HOSPITAL 76 - ED | Age: 69
Discharge: HOME | End: 2019-06-11
Payer: MEDICARE

## 2019-06-11 VITALS — SYSTOLIC BLOOD PRESSURE: 200 MMHG | DIASTOLIC BLOOD PRESSURE: 99 MMHG

## 2019-06-11 DIAGNOSIS — K92.1: Primary | ICD-10-CM

## 2019-06-11 DIAGNOSIS — R19.7: ICD-10-CM

## 2019-06-11 DIAGNOSIS — R11.10: ICD-10-CM

## 2019-06-11 DIAGNOSIS — F17.200: ICD-10-CM

## 2019-06-11 DIAGNOSIS — Z87.19: ICD-10-CM

## 2019-06-11 DIAGNOSIS — I10: ICD-10-CM

## 2019-06-11 LAB
ALBUMIN DIAFP-MCNC: 4.2 G/DL (ref 3.2–5.5)
ALBUMIN/GLOB SERPL: 1.3 {RATIO} (ref 1–2.2)
ALP SERPL-CCNC: 52 IU/L (ref 42–121)
ALT SERPL W P-5'-P-CCNC: 19 IU/L (ref 10–60)
ANION GAP SERPL CALCULATED.4IONS-SCNC: 13 MMOL/L (ref 6–13)
AST SERPL W P-5'-P-CCNC: 23 IU/L (ref 10–42)
BASOPHILS NFR BLD AUTO: 0.1 10^3/UL (ref 0–0.1)
BASOPHILS NFR BLD AUTO: 0.9 %
BILIRUB BLD-MCNC: 0.8 MG/DL (ref 0.2–1)
BUN SERPL-MCNC: 12 MG/DL (ref 6–20)
CALCIUM UR-MCNC: 9.5 MG/DL (ref 8.5–10.3)
CHLORIDE SERPL-SCNC: 99 MMOL/L (ref 101–111)
CLARITY UR REFRACT.AUTO: CLEAR
CO2 SERPL-SCNC: 23 MMOL/L (ref 21–32)
CREAT SERPLBLD-SCNC: 0.6 MG/DL (ref 0.4–1)
EOSINOPHIL # BLD AUTO: 0.6 10^3/UL (ref 0–0.7)
EOSINOPHIL NFR BLD AUTO: 5 %
ERYTHROCYTE [DISTWIDTH] IN BLOOD BY AUTOMATED COUNT: 13.3 % (ref 12–15)
GFRSERPLBLD MDRD-ARVRAT: 100 ML/MIN/{1.73_M2} (ref 89–?)
GLOBULIN SER-MCNC: 3.2 G/DL (ref 2.1–4.2)
GLUCOSE SERPL-MCNC: 121 MG/DL (ref 70–100)
GLUCOSE UR QL STRIP.AUTO: NEGATIVE MG/DL
HGB UR QL STRIP: 14 G/DL (ref 12–16)
KETONES UR QL STRIP.AUTO: 15 MG/DL
LIPASE SERPL-CCNC: 21 U/L (ref 22–51)
LYMPHOCYTES # SPEC AUTO: 1.5 10^3/UL (ref 1.5–3.5)
LYMPHOCYTES NFR BLD AUTO: 13.9 %
MCH RBC QN AUTO: 29.6 PG (ref 27–31)
MCHC RBC AUTO-ENTMCNC: 33.2 G/DL (ref 32–36)
MCV RBC AUTO: 89.2 FL (ref 81–99)
MONOCYTES # BLD AUTO: 1 10^3/UL (ref 0–1)
MONOCYTES NFR BLD AUTO: 8.7 %
NEUTROPHILS # BLD AUTO: 8 10^3/UL (ref 1.5–6.6)
NEUTROPHILS # SNV AUTO: 11.1 X10^3/UL (ref 4.8–10.8)
NEUTROPHILS NFR BLD AUTO: 71.5 %
NITRITE UR QL STRIP.AUTO: NEGATIVE
PDW BLD AUTO: 8.6 FL (ref 7.9–10.8)
PH UR STRIP.AUTO: 5.5 PH (ref 5–7.5)
PLATELET # BLD: 200 10^3/UL (ref 130–450)
PROT SPEC-MCNC: 7.4 G/DL (ref 6.7–8.2)
PROT UR STRIP.AUTO-MCNC: NEGATIVE MG/DL
RBC # UR STRIP.AUTO: NEGATIVE /UL
RBC # URNS HPF: (no result) /HPF (ref 0–5)
RBC MAR: 4.71 10^6/UL (ref 4.2–5.4)
SODIUM SERPLBLD-SCNC: 135 MMOL/L (ref 135–145)
SP GR UR STRIP.AUTO: 1.02 (ref 1–1.03)
SQUAMOUS URNS QL MICRO: (no result)
UROBILINOGEN UR QL STRIP.AUTO: (no result) E.U./DL
UROBILINOGEN UR STRIP.AUTO-MCNC: NEGATIVE MG/DL

## 2019-06-11 PROCEDURE — 96372 THER/PROPH/DIAG INJ SC/IM: CPT

## 2019-06-11 PROCEDURE — 83690 ASSAY OF LIPASE: CPT

## 2019-06-11 PROCEDURE — 87086 URINE CULTURE/COLONY COUNT: CPT

## 2019-06-11 PROCEDURE — 80053 COMPREHEN METABOLIC PANEL: CPT

## 2019-06-11 PROCEDURE — 81001 URINALYSIS AUTO W/SCOPE: CPT

## 2019-06-11 PROCEDURE — 99283 EMERGENCY DEPT VISIT LOW MDM: CPT

## 2019-06-11 PROCEDURE — 81003 URINALYSIS AUTO W/O SCOPE: CPT

## 2019-06-11 PROCEDURE — 85025 COMPLETE CBC W/AUTO DIFF WBC: CPT

## 2019-06-11 NOTE — ED PHYSICIAN DOCUMENTATION
History of Present Illness





- Stated complaint


Stated Complaint: V/D/N BLOODY STOOL





- Chief complaint


Chief Complaint: Abd Pain





- History obtained from


History obtained from: Patient





- History of Present Illness


Timing: Today





- Additonal information


Additional information: 





This is a 68-year-old woman who presents with complaints that she developed 

abdominal pain this morning.  She last ate around noon yesterday she had 

[employer] and fries from .  She does not normally eat breakfast and 

got up this morning feeling pretty good and then suddenly around 930 she got 

very dizzy went into the bathroom, vomited and had watery diarrhea.  Through the

day she is developed some bright red blood in the diarrhea has had multiple 

episodes.  She is concerned because she had colitis just after [place].  She 

was treated with antibiotics and referred for colonoscopy which she underwent at

[place].  She said the colonoscopy was clear.  They also did some sort of 

abdominal ultrasound imaging and did not find anything there.  Patient reports 

the pain now is more of a dull aching not nearly as severe as it was when she 

had a colitis.  She is felt chilled to the day but did not check her 

temperature.  She did have a very long day yesterday shopping all day with her 

daughter.  She does complain of feeling some pressure in her bladder but not any

burning with urination and no blood in the urine.  No other illness.  She does 

take Tylenol 3 and Flexeril for back pain and is on high blood pressure 

medications which she takes twice a day.  She did not take her dosing tonight.  

Her only abdominal surgeries were .





Review of Systems


Constitutional: reports: Fever, Chills


Nose: denies: Congestion


Throat: denies: Sore throat


Cardiac: denies: Chest pain / pressure, Palpitations


Respiratory: denies: Dyspnea, Cough


GI: reports: Abdominal Pain, Vomiting, Diarrhea.  denies: Nausea


: denies: Dysuria, Frequency, Hematuria


Neurologic: reports: Headache (Yesterday)


Endocrine: reports: Other (She is not diabetic)





PD PAST MEDICAL HISTORY





- Past Medical History


Cardiovascular: Hypertension


Respiratory: Asthma, Shortness of breath


Neuro: None


Endocrine/Autoimmune: None


GI: None


GYN: Other


: None


HEENT: None


Psych: Panic attacks


Musculoskeletal: Osteoarthritis


Derm: None





- Past Surgical History


Past Surgical History: Yes


General: Colonoscopy


Ortho: Spine surgery


/GYN:  section, Dilation and currettage, Tubal ligation, Hysterectomy,

LEEP (Cervical surgery)


HEENT: Tonsil/Adenoidectomy





- Present Medications


Home Medications: 


                                Ambulatory Orders











 Medication  Instructions  Recorded  Confirmed


 


Lisinopril 40 mg PO DAILY 05/08/13 10/02/17


 


Multivitamin [Multivitamins] 1 each PO DAILY 07/21/14 10/02/17


 


Carvedilol 12.5 mg PO BID 02/03/15 10/02/17


 


Calcium Carb, Citrate/Vit D3 600 mg PO BID 08/04/15 10/02/17





[Calcium + D3 ER Tablet]   


 


Albuterol 2.5 mg INH Q4H PRN 16


 


Albuterol Sulf [Ventolin Hfa 2 puffs INH Q4H PRN 16





Inhaler]   


 


Tiotropium Bromide [Spiriva] 2 puffs IH DAILY 12/12/16 10/02/17


 


buPROPion [Wellbutrin Sr] 150 mg PO BID 10/02/17 10/02/17


 


Ciprofloxacin HCl [Cipro] 500 mg PO BID #20 tablet 18 


 


Hydrocodone/Acetaminophen 1 - 2 each PO Q6H PRN #10 tablet 18 





[Hydrocodon-Acetaminophen 5-325]   


 


Metronidazole [Flagyl] 500 mg PO BID #20 tablet 18 


 


amLODIPine [Norvasc] 5 mg PO DAILY 18














- Allergies


Allergies/Adverse Reactions: 


                                    Allergies











Allergy/AdvReac Type Severity Reaction Status Date / Time


 


Latex, Natural Rubber Allergy  Itching Verified 18 12:49


 


Penicillins Allergy  Hives Verified 18 12:49


 


adhesive AdvReac Intermediate Rash Verified 18 12:49














- Social History


Does the pt smoke?: Yes


Smoking Status: Current every day smoker


Does the pt drink ETOH?: No


Does the pt have substance abuse?: No





- Immunizations


Immunizations are current?: No


Immunizations: TDAP >10years/unknown





- POLST


Patient has POLST: No





PD ED PE NORMAL





- Vitals


Vital signs reviewed: Yes (Very pleasant 68-year-old woman who is not in acute 

distress.)





- General


General: Alert and oriented X 3, No acute distress, Well developed/nourished





- HEENT


HEENT: Atraumatic, PERRL, Moist mucous membranes





- Neck


Neck: No adenopathy





- Cardiac


Cardiac: RRR, No murmur





- Respiratory


Respiratory: No respiratory distress, Clear bilaterally





- Abdomen


Abdomen: Normal bowel sounds, Soft, Other (Minimal tenderness with palpation to 

the left side of the abdomen.)





- Derm


Derm: Normal color, Warm and dry, No rash





- Extremities


Extremities: No edema





- Neuro


Neuro: Alert and oriented X 3, Normal speech, Other (No gross neurological 

deficits.)





- Psych


Psych: Normal mood, Normal affect





Results





- Vitals


Vitals: 


                               Vital Signs - 24 hr











  19





  19:49 19:58 22:16


 


Temperature 36.8 C  


 


Heart Rate 93 93 98


 


Respiratory 17 17 18





Rate   


 


Blood Pressure 186/82 H 186/82 H 200/99 H


 


O2 Saturation 97 97 96








                                     Oxygen











O2 Source                      Room air

















- Labs


Labs: 


                                Laboratory Tests











  19





  20:10 20:31 20:31


 


WBC   11.1 H 


 


RBC   4.71 


 


Hgb   14.0 


 


Hct   42.0 


 


MCV   89.2 


 


MCH   29.6 


 


MCHC   33.2 


 


RDW   13.3 


 


Plt Count   200 


 


MPV   8.6 


 


Neut # (Auto)   8.0 H 


 


Lymph # (Auto)   1.5 


 


Mono # (Auto)   1.0 


 


Eos # (Auto)   0.6 


 


Baso # (Auto)   0.1 


 


Absolute Nucleated RBC   0.01 


 


Nucleated RBC %   0.1 


 


Sodium    135


 


Potassium    3.9


 


Chloride    99 L


 


Carbon Dioxide    23


 


Anion Gap    13.0


 


BUN    12


 


Creatinine    0.6


 


Estimated GFR (MDRD)    100


 


Glucose    121 H


 


Calcium    9.5


 


Total Bilirubin    0.8


 


AST    23


 


ALT    19


 


Alkaline Phosphatase    52


 


Total Protein    7.4


 


Albumin    4.2


 


Globulin    3.2


 


Albumin/Globulin Ratio    1.3


 


Lipase    21 L


 


Urine Color  YELLOW  


 


Urine Clarity  CLEAR  


 


Urine pH  5.5  


 


Ur Specific Gravity  1.020  


 


Urine Protein  NEGATIVE  


 


Urine Glucose (UA)  NEGATIVE  


 


Urine Ketones  15 H  


 


Urine Occult Blood  NEGATIVE  


 


Urine Nitrite  NEGATIVE  


 


Urine Bilirubin  NEGATIVE  


 


Urine Urobilinogen  0.2 (NORMAL)  


 


Ur Leukocyte Esterase  TRACE H  


 


Urine RBC  0-5  


 


Urine WBC  0-3  


 


Ur Squamous Epith Cells  FEW Squamous  


 


Urine Bacteria  Rare  


 


Ur Microscopic Review  INDICATED  


 


Urine Culture Comments  INDICATED  














PD MEDICAL DECISION MAKING





- ED course


Complexity details: d/w patient, d/w family


ED course: 





Patient does have mild elevation of the white blood cell count of 11.1.  

Urinalysis had some leukocyte esterase and culture has been set up.  I did 

review her ED visit report with CT scan showing diffuse colitis of the descen

ding colon.  Her colonoscopy report is not available, but she reports that she 

was not told she had UC or Crohn's.  I discussed with her that with her current 

presentation I would not put her immediately on antibiotics.  We talked about 

taking probiotics, eating bland foods and avoiding Guallpa's.  Follow-up with 

her primary care provider for further management if her symptoms persist to 

return to the emergency department if they are worsening.





Departure





- Departure


Disposition:  Home, Self Care


Clinical Impression: 


 Hematochezia





Vomiting


Qualifiers:


 Vomiting type: unspecified Vomiting Intractability: non-intractable Nausea 

presence: unspecified Qualified Code(s): R11.10 - Vomiting, unspecified





Diarrhea


Qualifiers:


 Diarrhea type: unspecified type Qualified Code(s): R19.7 - Diarrhea, 

unspecified





Condition: Good


Instructions:  ED Diet Vomiting Diarrhea, ED Vomiting Diarrhea Nonspecific Ad


Follow-Up: 


Chioma Martinez ARNP [Primary Care Provider] - 


Comments: 


Eat bland foods.  I would encourage you to use probiotic, Booker FloraMend or a 

capsule that has 4-5 active cultures would be sufficient.  Return to the 

emergency department if you have increasing pain, develop a fever or worsening 

bloody diarrhea.  Otherwise follow-up with your primary care provider for 

reevaluation.  Your urine has been cultured and if you need antibiotics she 

should be contacted.


Discharge Date/Time: 19 22:37

## 2020-02-14 ENCOUNTER — HOSPITAL ENCOUNTER (OUTPATIENT)
Dept: HOSPITAL 76 - DI | Age: 70
Discharge: HOME | End: 2020-02-14
Attending: GENERAL ACUTE CARE HOSPITAL
Payer: MEDICARE

## 2020-02-14 DIAGNOSIS — Z12.31: Primary | ICD-10-CM

## 2020-02-14 DIAGNOSIS — Z85.3: ICD-10-CM

## 2020-02-14 DIAGNOSIS — Z80.3: ICD-10-CM

## 2020-02-14 PROCEDURE — 77067 SCR MAMMO BI INCL CAD: CPT

## 2020-02-14 PROCEDURE — 77063 BREAST TOMOSYNTHESIS BI: CPT

## 2020-02-18 NOTE — MAMMOGRAPHY REPORT
Reason:  ROUTINE MAMMO

Procedure Date:  02/14/2020   

Accession Number:  [id number] / [id number]                    

Procedure:  RODRIGUEZ - Screening Mammo w/Tate CPT Code:  

 

***Final Report***

 

 

FULL RESULT:

 

 

EXAM: Screening Mammo w/Tate

 

DATE: 2/14/2020 11:15 AM

 

CLINICAL HISTORY:  Screening encounter. Personal history of right breast 

cancer status post lumpectomy in 2012 with radiation therapy. Family 

history of breast cancer in a sister at the age of 50.

 

TECHNIQUE: (B) - Bilateral  CC and MLO views were obtained.

 

COMPARISON: 2/25/2019 through 7/10/2013.

 

PARENCHYMAL PATTERN: (A) - The breast(s) demonstrate(s) scattered 

fibroglandular densities.

 

FINDINGS:

Posttreatment changes in the right breast are stable. There are no 

suspicious masses, calcifications, or areas of distortion.

 

IMPRESSION: Benign findings. BI-RADS category 2.

 

RECOMMENDATION: (ANNUAL)  - Recommend routine annual screening 

mammography.

 

BI-RADS CATEGORY: (2) - Benign Findings.

 

STANDARD QUALIFYING STATEMENTS:

1.  This examination was not reviewed with the aid of Computer-Aided 

Detection (CAD).

2.  A negative or benign  imaging report should not preclude biopsy if 

clinically suspicious findings are present.

3.  Dense breasts may obscure an underlying neoplasm.

4. This examination was reviewed with the aid of 3D breast imaging 

(tomosynthesis).